# Patient Record
Sex: MALE | Race: WHITE
[De-identification: names, ages, dates, MRNs, and addresses within clinical notes are randomized per-mention and may not be internally consistent; named-entity substitution may affect disease eponyms.]

---

## 2018-02-08 ENCOUNTER — HOSPITAL ENCOUNTER (OUTPATIENT)
Dept: HOSPITAL 17 - NEPC | Age: 43
Setting detail: OBSERVATION
Discharge: HOME | End: 2018-02-08
Attending: INTERNAL MEDICINE | Admitting: INTERNAL MEDICINE
Payer: COMMERCIAL

## 2018-02-08 VITALS
HEART RATE: 71 BPM | SYSTOLIC BLOOD PRESSURE: 164 MMHG | RESPIRATION RATE: 16 BRPM | DIASTOLIC BLOOD PRESSURE: 94 MMHG | TEMPERATURE: 97.7 F | OXYGEN SATURATION: 99 %

## 2018-02-08 VITALS
HEART RATE: 95 BPM | SYSTOLIC BLOOD PRESSURE: 207 MMHG | RESPIRATION RATE: 14 BRPM | OXYGEN SATURATION: 98 % | DIASTOLIC BLOOD PRESSURE: 105 MMHG | TEMPERATURE: 98.2 F

## 2018-02-08 VITALS — HEIGHT: 66 IN | WEIGHT: 205.43 LBS | BODY MASS INDEX: 33.01 KG/M2

## 2018-02-08 VITALS
DIASTOLIC BLOOD PRESSURE: 96 MMHG | RESPIRATION RATE: 18 BRPM | SYSTOLIC BLOOD PRESSURE: 214 MMHG | OXYGEN SATURATION: 99 % | HEART RATE: 82 BPM

## 2018-02-08 VITALS
OXYGEN SATURATION: 96 % | HEART RATE: 77 BPM | DIASTOLIC BLOOD PRESSURE: 96 MMHG | SYSTOLIC BLOOD PRESSURE: 184 MMHG | RESPIRATION RATE: 19 BRPM

## 2018-02-08 VITALS
SYSTOLIC BLOOD PRESSURE: 151 MMHG | RESPIRATION RATE: 16 BRPM | HEART RATE: 74 BPM | DIASTOLIC BLOOD PRESSURE: 82 MMHG | OXYGEN SATURATION: 98 %

## 2018-02-08 VITALS — DIASTOLIC BLOOD PRESSURE: 92 MMHG | SYSTOLIC BLOOD PRESSURE: 161 MMHG

## 2018-02-08 DIAGNOSIS — E78.5: ICD-10-CM

## 2018-02-08 DIAGNOSIS — Z79.899: ICD-10-CM

## 2018-02-08 DIAGNOSIS — F17.200: ICD-10-CM

## 2018-02-08 DIAGNOSIS — R94.31: ICD-10-CM

## 2018-02-08 DIAGNOSIS — R07.89: ICD-10-CM

## 2018-02-08 DIAGNOSIS — I10: Primary | ICD-10-CM

## 2018-02-08 LAB
BASOPHILS # BLD AUTO: 0 TH/MM3 (ref 0–0.2)
BASOPHILS NFR BLD: 0.5 % (ref 0–2)
BUN SERPL-MCNC: 14 MG/DL (ref 7–18)
CALCIUM SERPL-MCNC: 8.3 MG/DL (ref 8.5–10.1)
CHLORIDE SERPL-SCNC: 106 MEQ/L (ref 98–107)
CREAT SERPL-MCNC: 0.96 MG/DL (ref 0.6–1.3)
EOSINOPHIL # BLD: 0.3 TH/MM3 (ref 0–0.4)
EOSINOPHIL NFR BLD: 3.6 % (ref 0–4)
ERYTHROCYTE [DISTWIDTH] IN BLOOD BY AUTOMATED COUNT: 13.5 % (ref 11.6–17.2)
GFR SERPLBLD BASED ON 1.73 SQ M-ARVRAT: 86 ML/MIN (ref 89–?)
GLUCOSE SERPL-MCNC: 99 MG/DL (ref 74–106)
HCO3 BLD-SCNC: 27.2 MEQ/L (ref 21–32)
HCT VFR BLD CALC: 46.8 % (ref 39–51)
HGB BLD-MCNC: 16.2 GM/DL (ref 13–17)
INR PPP: 1.1 RATIO
LYMPHOCYTES # BLD AUTO: 1.5 TH/MM3 (ref 1–4.8)
LYMPHOCYTES NFR BLD AUTO: 17.1 % (ref 9–44)
MAGNESIUM SERPL-MCNC: 1.9 MG/DL (ref 1.5–2.5)
MCH RBC QN AUTO: 32 PG (ref 27–34)
MCHC RBC AUTO-ENTMCNC: 34.6 % (ref 32–36)
MCV RBC AUTO: 92.4 FL (ref 80–100)
MONOCYTE #: 0.8 TH/MM3 (ref 0–0.9)
MONOCYTES NFR BLD: 8.7 % (ref 0–8)
NEUTROPHILS # BLD AUTO: 6.2 TH/MM3 (ref 1.8–7.7)
NEUTROPHILS NFR BLD AUTO: 70.1 % (ref 16–70)
PLATELET # BLD: 206 TH/MM3 (ref 150–450)
PMV BLD AUTO: 9 FL (ref 7–11)
PROTHROMBIN TIME: 10.8 SEC (ref 9.8–11.6)
RBC # BLD AUTO: 5.06 MIL/MM3 (ref 4.5–5.9)
SODIUM SERPL-SCNC: 139 MEQ/L (ref 136–145)
TROPONIN I SERPL-MCNC: (no result) NG/ML (ref 0.02–0.05)
TROPONIN I SERPL-MCNC: (no result) NG/ML (ref 0.02–0.05)
WBC # BLD AUTO: 8.8 TH/MM3 (ref 4–11)

## 2018-02-08 PROCEDURE — 82552 ASSAY OF CPK IN BLOOD: CPT

## 2018-02-08 PROCEDURE — 85730 THROMBOPLASTIN TIME PARTIAL: CPT

## 2018-02-08 PROCEDURE — G0378 HOSPITAL OBSERVATION PER HR: HCPCS

## 2018-02-08 PROCEDURE — 93017 CV STRESS TEST TRACING ONLY: CPT

## 2018-02-08 PROCEDURE — 93005 ELECTROCARDIOGRAM TRACING: CPT

## 2018-02-08 PROCEDURE — 82550 ASSAY OF CK (CPK): CPT

## 2018-02-08 PROCEDURE — 71046 X-RAY EXAM CHEST 2 VIEWS: CPT

## 2018-02-08 PROCEDURE — 83735 ASSAY OF MAGNESIUM: CPT

## 2018-02-08 PROCEDURE — 85610 PROTHROMBIN TIME: CPT

## 2018-02-08 PROCEDURE — 84484 ASSAY OF TROPONIN QUANT: CPT

## 2018-02-08 PROCEDURE — 85025 COMPLETE CBC W/AUTO DIFF WBC: CPT

## 2018-02-08 PROCEDURE — 96374 THER/PROPH/DIAG INJ IV PUSH: CPT

## 2018-02-08 PROCEDURE — 99285 EMERGENCY DEPT VISIT HI MDM: CPT

## 2018-02-08 PROCEDURE — 80048 BASIC METABOLIC PNL TOTAL CA: CPT

## 2018-02-08 NOTE — PD
HPI


Chief Complaint:  Chest Pain


Time Seen by Provider:  13:51


Travel History


International Travel<30 days:  No


Contact w/Intl Traveler<30days:  No


Traveled to known affect area:  No





History of Present Illness


HPI


Patient comes to the emergency department complaining of chest pain began 

earlier today.  Please states he felt short of breath over the past couple days 

then today he felt a tightness throughout his chest radiating into his back and 

tingling in his left upper extremity.  Patient reports he is supposed be on 

blood pressure medicine but has been off of it for 2 months.  Patient reports 

he last had a stress test 5 years ago and last saw a cardiologist 5 years ago 

as well.  Denies any headache, diaphoresis, nausea, vomiting, fevers, or 

abdominal pain.  Denies anything making symptoms better or worse, but does feel 

better currently.  Patient states he took 2 full-strength aspirin prior to 

coming to the emergency department.





PFSH


Past Medical History


Cardiovascular Problems:  Yes (HTN, cholesterol)


High Cholesterol:  Yes


Hypertension:  Yes


Tetanus Vaccination:  < 5 Years


Influenza Vaccination:  No





Past Surgical History


Surgical History:  No Previous Surgery





Social History


Alcohol Use:  Yes (DAILY)


Tobacco Use:  Yes


Substance Use:  No





Allergies-Medications


(Allergen,Severity, Reaction):  


Coded Allergies:  


     No Known Allergies (Unverified , 2/8/18)


Reported Meds & Prescriptions





Reported Meds & Active Scripts


Active


No Active Prescriptions or Reported Medications    








Review of Systems


Except as stated in HPI:  all other systems reviewed are Neg





Physical Exam


Narrative


GENERAL: Well-developed, well nourished, in no acute distress, and non-ill 

appearing.


SKIN: Focused skin assessment warm and dry.


HEAD: Atraumatic. Normocephalic. 


EYES: Pupils equal and round. EOMI. No scleral icterus. No injection or 

drainage. 


ENT: No nasal bleeding or discharge.  Mucous membranes pink and moist.


NECK: Trachea midline. No JVD. Supple.  No nuclear rigidity.


CARDIOVASCULAR: Regular rate and rhythm.  No murmur appreciated.


RESPIRATORY: No accessory muscle use.  No respiratory distress. Clear to 

auscultation. Breath sounds equal bilaterally. 


MUSCULOSKELETAL: No obvious deformities. No clubbing.  No cyanosis.  No edema.  

Full range of motion.


NEUROLOGICAL: Awake and alert. No obvious cranial nerve deficits.  Motor 

grossly within normal limits. Normal speech.


PSYCHIATRIC: Appropriate mood and affect; insight and judgment normal.





Data


Data


Last Documented VS





Vital Signs








  Date Time  Temp Pulse Resp B/P (MAP) Pulse Ox O2 Delivery O2 Flow Rate FiO2


 


2/8/18 14:10     96 Room Air  


 


2/8/18 14:10  77 19 184/103 (130)    





    214/96 (135)    


 


2/8/18 12:20 98.2       








Orders





 Orders


Electrocardiogram (2/8/18 12:40)


Basic Metabolic Panel (Bmp) (2/8/18 12:40)


Ckmb (Isoenzyme) Profile (2/8/18 12:40)


Complete Blood Count With Diff (2/8/18 12:40)


Magnesium (Mg) (2/8/18 12:40)


Prothrombin Time / Inr (Pt) (2/8/18 12:40)


Act Partial Throm Time (Ptt) (2/8/18 12:40)


Troponin I (2/8/18 12:40)


Chest, Pa & Lat (2/8/18 12:40)


CKMB (2/8/18 12:55)


CKMB% (2/8/18 12:55)


Ecg Monitoring (2/8/18 14:00)


Bilateral Bp Monitoring (2/8/18 14:00)


Iv Access Insert/Monitor (2/8/18 14:00)


Oximetry (2/8/18 14:00)


Oxygen Administration (2/8/18 14:00)


Nitroglycerin 2% Oint (Nitroglycerin 2% (2/8/18 14:00)


Sodium Chloride 0.9% Flush (Ns Flush) (2/8/18 14:00)


Hydralazine Inj (Apresoline Inj) (2/8/18 14:00)


Admit Order (Ed Use Only) (2/8/18 14:45)





Labs





Laboratory Tests








Test


  2/8/18


12:55


 


White Blood Count 8.8 TH/MM3 


 


Red Blood Count 5.06 MIL/MM3 


 


Hemoglobin 16.2 GM/DL 


 


Hematocrit 46.8 % 


 


Mean Corpuscular Volume 92.4 FL 


 


Mean Corpuscular Hemoglobin 32.0 PG 


 


Mean Corpuscular Hemoglobin


Concent 34.6 % 


 


 


Red Cell Distribution Width 13.5 % 


 


Platelet Count 206 TH/MM3 


 


Mean Platelet Volume 9.0 FL 


 


Neutrophils (%) (Auto) 70.1 % 


 


Lymphocytes (%) (Auto) 17.1 % 


 


Monocytes (%) (Auto) 8.7 % 


 


Eosinophils (%) (Auto) 3.6 % 


 


Basophils (%) (Auto) 0.5 % 


 


Neutrophils # (Auto) 6.2 TH/MM3 


 


Lymphocytes # (Auto) 1.5 TH/MM3 


 


Monocytes # (Auto) 0.8 TH/MM3 


 


Eosinophils # (Auto) 0.3 TH/MM3 


 


Basophils # (Auto) 0.0 TH/MM3 


 


CBC Comment DIFF FINAL 


 


Differential Comment  


 


Prothrombin Time 10.8 SEC 


 


Prothromb Time International


Ratio 1.1 RATIO 


 


 


Activated Partial


Thromboplast Time 28.5 SEC 


 


 


Blood Urea Nitrogen 14 MG/DL 


 


Creatinine 0.96 MG/DL 


 


Random Glucose 99 MG/DL 


 


Calcium Level 8.3 MG/DL 


 


Magnesium Level 1.9 MG/DL 


 


Sodium Level 139 MEQ/L 


 


Potassium Level 4.0 MEQ/L 


 


Chloride Level 106 MEQ/L 


 


Carbon Dioxide Level 27.2 MEQ/L 


 


Anion Gap 6 MEQ/L 


 


Estimat Glomerular Filtration


Rate 86 ML/MIN 


 


 


Total Creatine Kinase 118 U/L 


 


Creatine Kinase MB 0.7 NG/ML 


 


Troponin I


  LESS THAN 0.02


NG/ML











MDM


Medical Decision Making


Medical Screen Exam Complete:  Yes


Emergency Medical Condition:  Yes


Interpretation(s)


EKG reviewed by Dr. Gusman shows sinus rhythm ventricular rate of 68.  No 

STEMI.








Last Impressions








Chest X-Ray 2/8/18 1240 Signed





Impressions: 





 Service Date/Time:  Thursday, February 8, 2018 13:20 - CONCLUSION:  No acute 





 cardiopulmonary process. Lungs are clear.     Jimenez Cash MD 








Differential Diagnosis


Acute coronary syndrome, atypical chest pain, uncontrolled hypertension, 

hypertension urgency, medical noncompliance, metabolic disturbance, pneumonia, 

pneumothorax


Narrative Course


Patient was seen and examined.  IV was established and patient was placed on 

cardiac monitoring.  Patient was given Nitropaste and IV hydralazine, which 

improved patient's blood pressure.  Labs had been ordered in triage were 

reviewed along with chest x-ray and EKG.  Discussed all findings and plan of 

care with patient, who is agreeable for admission.  All questions were 

answered.  Discussed patient with Dr. Gusman, who is in agreement with plan of 

care and disposition.  Patient remained stable throughout ED course.





Diagnosis





 Primary Impression:  


 Chest pain


 Qualified Codes:  R07.9 - Chest pain, unspecified





Admitting Information


Admitting Physician Requests:  Observation


Scripts


No Active Prescriptions or Reported Meds


Condition:  Stable











Kevin Garcia Feb 8, 2018 14:06

## 2018-02-08 NOTE — HHI.HP
HPI


Primary Care Physician


No Primary Care Physician


Chief Complaint


Chest tightness


History of Present Illness


42-year-old male with history of hypertension and hyperlipidemia presents to 

emergency room for further evaluation of chest tightness.  Endorses been out of 

his cholesterol and blood pressure medication for a couple months.  Onset 9 AM.

  Location substernal.  Characterized as tightness.  Severity moderate.  

Radiation left shoulder blade describes as "soreness" lasting few minutes with 

accompanying left fingertips numbness/tingling. No associated symptoms of nausea

, vomiting, dyspnea, or diaphoreses.  No known precipitating or relieving 

factors.  Endorses last 2-3 weeks difficult to take a deep breath with 

intermittent nonproductive cough. No fever or chills. Current chest pain free. 

Endorses similar chest discomfort in the past, having cardiac work up three 

times, all reported to be normal.





Review of Systems


General: No fatigue,weakness, fever, chills, recent illness, or change in 

appetite


HEENT: No HA, no vision changes, no nasal congestion, no dysphasia, 

intermittent postnasal drip 2 weeks


CV: As stated above.  No current chest tightness.  No palpitations, 

intermittent leg pain, or dizziness


RESP: No SOB, cough, wheeze, or upper respiratory infection


GI: No nausea, vomiting, bowel changes, diarrhea, constipation, pain, distention

, melena, or blood in the stool.  No change in appetite, no unintentional 

weight gain or weight loss.


: No dysuria, urgency, frequency


EXT: No lower leg edema, no paraesthesias


MS: No discomfort,injury, recent trauma, or change in ROM


NEURO: No difficulty with balance, LOC, or motor/sensory deficits


PSYCH: No anxiety, depression, suicidal ideation. Past panic attacks, however 

does not feel to be under much stress lately. 


SKIN: No rashes, no concerning lesions





Past Family Social History


Allergies:  


Coded Allergies:  


     No Known Allergies (Unverified , 18)


Past Medical History


Hypertension, hyperlipidemia


Past Surgical History


None


Reported Medications





Reported Meds & Active Scripts


Active


No Active Prescriptions or Reported Medications    


Was taking lipitor 80mg daily and lisinopril 10mg daily, has ran out of 

medications x2 months


Active Ordered Medications





Current Medications








 Medications


  (Trade)  Dose


 Ordered  Sig/Ruth


 Route  Start Time


 Stop Time Status Last Admin


 


  (NS Flush)  2 ml  UNSCH  PRN


 IVF  18 14:00


     


 


 


  (NS Flush)  2 ml  BID


 IV FLUSH  18 21:00


     


 


 


  (Tylenol)  500 mg  Q4H  PRN


 PO  18 15:15


     


 


 


  (Zofran Inj)  4 mg  Q6H  PRN


 IV PUSH  18 15:15


     


 


 


  (Nitrostat Sl)  0.4 mg  Q5M  PRN


 SL  18 15:15


     


 


 


  (Aspirin)  325 mg  DAILY


 PO  18 09:00


   UNV  


 








Family History


Noncontributory for early onset cardiovascular disease.


Social History


Known hypertension and hyperlipidemia (out of medications x2 months). No known 

diabetes or CAD.


Current 1 pack/day smoker x 20 years. Endorses 6-7 beers nightly. Denies any 

illegal drug use.


Endorses sedentary lifestyle.





Past cardiac testing


No recent stress testing, last exercise testing approx. 5-6 years.





Physical Exam


Vital Signs





Vital Signs








  Date Time  Temp Pulse Resp B/P (MAP) Pulse Ox O2 Delivery O2 Flow Rate FiO2


 


18 15:50  76 16 161/92 (115) 99   


 


18 14:48  74 16 151/82 (105) 98 Room Air  


 


18 14:10     96 Room Air  


 


18 14:10  77 19 184/103 (130) 96 Room Air  





    214/96 (135)    


 


18 14:10  73 19 184/103 (130) 98 Room Air  


 


18 13:59  82 18 214/96 (135) 99 Room Air  


 


18 13:53  75 18  98 Room Air  


 


18 12:20 98.2 95 14 207/105 (139) 98   








Physical Exam


GENERAL: Alert WN, WD, NAD, pleasant,  male


HEAD: NC, AT


CV: RRR, without murmur, rub, gallop, no JVD, S1-S2 no S3-S4.  Chest wall 

nontender with palpation.


RESP: Clear lungs throughout bilateral, no crackles, wheeze, rhonchi, 

symmetrical chest rise, nonlabored, able to speak in full sentences


ABD: Soft, NT, ND, no masses, positive bowel tones


EXT: Pulses +24, no dependent edema


MS: Normal tone 4 extremities,  no obvious deformities, full range of motion


NEURO: CN II through CN XII grossly intact, motor strength 5/5, gait WNL


PSYCH: A+O 3, pleasant affect, appropriate speech, mood, insight and judgment


SKIN: Normal turgor, normal texture, no lesions, no rashes, brisk cap refill, 

even hair distribution


Laboratory





Laboratory Tests








Test


  18


12:55


 


White Blood Count 8.8 


 


Red Blood Count 5.06 


 


Hemoglobin 16.2 


 


Hematocrit 46.8 


 


Mean Corpuscular Volume 92.4 


 


Mean Corpuscular Hemoglobin 32.0 


 


Mean Corpuscular Hemoglobin


Concent 34.6 


 


 


Red Cell Distribution Width 13.5 


 


Platelet Count 206 


 


Mean Platelet Volume 9.0 


 


Neutrophils (%) (Auto) 70.1 


 


Lymphocytes (%) (Auto) 17.1 


 


Monocytes (%) (Auto) 8.7 


 


Eosinophils (%) (Auto) 3.6 


 


Basophils (%) (Auto) 0.5 


 


Neutrophils # (Auto) 6.2 


 


Lymphocytes # (Auto) 1.5 


 


Monocytes # (Auto) 0.8 


 


Eosinophils # (Auto) 0.3 


 


Basophils # (Auto) 0.0 


 


CBC Comment DIFF FINAL 


 


Differential Comment  


 


Prothrombin Time 10.8 


 


Prothromb Time International


Ratio 1.1 


 


 


Activated Partial


Thromboplast Time 28.5 


 


 


Blood Urea Nitrogen 14 


 


Creatinine 0.96 


 


Random Glucose 99 


 


Calcium Level 8.3 


 


Magnesium Level 1.9 


 


Sodium Level 139 


 


Potassium Level 4.0 


 


Chloride Level 106 


 


Carbon Dioxide Level 27.2 


 


Anion Gap 6 


 


Estimat Glomerular Filtration


Rate 86 


 


 


Total Creatine Kinase 118 


 


Creatine Kinase MB 0.7 


 


Troponin I LESS THAN 0.02 








Result Diagram:  


18 1255                                                                    

            18 1255





Imaging





Last 48 hours Impressions








Chest X-Ray 18 1240 Signed





Impressions: 





 Service Date/Time:   13:20 - CONCLUSION:  No acute 





 cardiopulmonary process. Lungs are clear.     Jimenez Cash MD 








Course


EKG


Normal sinus rhythm, no ST or T-segment changes





Caprini VTE Risk Assessment


Caprini VTE Risk Assessment:  No/Low Risk (score <= 1)


Caprini Risk Assessment Model











 Point Value = 1          Point Value = 2  Point Value = 3  Point Value = 5


 


Age 41-60


Minor surgery


BMI > 25 kg/m2


Swollen legs


Varicose veins


Pregnancy or postpartum


History of unexplained or recurrent


   spontaneous 


Oral contraceptives or hormone


   replacement


Sepsis (< 1 month)


Serious lung disease, including


   pneumonia (< 1 month)


Abnormal pulmonary function


Acute myocardial infarction


Congestive heart failure (< 1 month)


History of inflammatory bowel disease


Medical patient at bed rest Age 61-74


Arthroscopic surgery


Major open surgery (> 45 min)


Laparoscopic surgery (> 45 min)


Malignancy


Confined to bed (> 72 hours)


Immobilizing plaster cast


Central venous access Age >= 75


History of VTE


Family history of VTE


Factor V Leiden


Prothrombin 47636R


Lupus anticoagulant


Anticardiolipin antibodies


Elevated serum homocysteine


Heparin-induced thrombocytopenia


Other congenital or acquired


   thrombophilia Stroke (< 1 month)


Elective arthroplasty


Hip, pelvis, or leg fracture


Acute spinal cord injury (< 1 month)








Prophylaxis Regimen











   Total Risk


Factor Score Risk Level Prophylaxis Regimen


 


0-1      Low Early ambulation


 


2 Moderate Order ONE of the following:


*Sequential Compression Device (SCD)


*Heparin 5000 units SQ BID


 


3-4 Higher Order ONE of the following medications:


*Heparin 5000 units SQ TID


*Enoxaparin/Lovenox 40 mg SQ daily (WT < 150 kg, CrCl > 30 mL/min)


*Enoxaparin/Lovenox 30 mg SQ daily (WT < 150 kg, CrCl > 10-29 mL/min)


*Enoxaparin/Lovenox 30 mg SQ BID (WT < 150 kg, CrCl > 30 mL/min)


AND/OR


*Sequential Compression Device (SCD)


 


5 or more Highest Order ONE of the following medications:


*Heparin 5000 units SQ TID (Preferred with Epidurals)


*Enoxaparin/Lovenox 40 mg SQ daily (WT < 150 kg, CrCl > 30 mL/min)


*Enoxaparin/Lovenox 30 mg SQ daily (WT < 150 kg, CrCl > 10-29 mL/min)


*Enoxaparin/Lovenox 30 mg SQ BID (WT < 150 kg, CrCl > 30 mL/min)


AND


*Sequential Compression Device (SCD)











Assessment and Plan


Code Status


#1 Atypical chest pain-admitted chest pain center.  Rule out with 2 sets of EKG 

and cardiac enzymes. Seen and evaluated by Dr. Pascual Charles.  Proceed with 

exercise stress test this evening.  If unremarkable discharge later this 

evening.  Patient agreeable to plan of care.  Strongly encouraged stress 

importance of establishing with a primary care provider.  Verbalized 

understanding.


#2 Hypertension-amlodipine 5 mg 1 dose now, clonidine 0.1 mg every 6 hours 

when necessary for systolic greater than 180 or diastolic greater than 100.  

Upon discharge will provide prescription for lisinopril 10 mg with 1 refill.  

Strongly encouraged and stressed the importance of tight blood pressure control 

and not running out of blood pressure medication.  Encouraged increasing daily 

activity, weight loss, and sodium restriction of no more than 1-2 grams daily.


#3 History of hyperlipidemia-will rewrite atorvastatin 80 mg daily at discharge.


#4 Tobacco use-strongly encouraged and stressed the importance of tobacco 

cessation.  Instructed to quit smoking.


Discussed daily recommended intake of alcohol of no more than 2 drinks daily.  

Discussed importance of decreasing his alcohol intake.











Kemi Bird 2018 16:03

## 2018-02-08 NOTE — RADRPT
EXAM DATE/TIME:  02/08/2018 13:20 

 

[facility] COMPARISON:     

No previous studies available for comparison.

 

                     

INDICATIONS :     

Chest tightness, cough.

                     

 

MEDICAL HISTORY :     

None.          

 

SURGICAL HISTORY :     

None.   

 

ENCOUNTER:     

Initial                                        

 

ACUITY:     

2 weeks      

 

PAIN SCORE:     

2/10

 

LOCATION:     

Bilateral chest 

 

FINDINGS:     

PA and lateral views of the chest demonstrate the lungs to be symmetrically aerated without evidence 
of mass, infiltrate or effusion.  The cardiomediastinal contours are unremarkable.  Osseous structure
s are intact.

 

CONCLUSION:     

No acute cardiopulmonary process. Lungs are clear.

 

 

 

 Jimenez Cash MD on February 08, 2018 at 13:30           

Board Certified Radiologist.

 This report was verified electronically.

## 2018-02-08 NOTE — HHI.DCPOC
Discharge Care Plan


Diagnosis:  


(1) Hypertension


(2) Atypical chest pain


(3) History of hyperlipidemia


Goals to Promote Your Health


* To prevent worsening of your condition and complications


* To maintain your health at the optimal level


Directions to Meet Your Goals


*** Take your medications as prescribed


*** Follow your dietary instruction


*** Follow activity as directed








*** Keep your appointments as scheduled


*** Take your immunizations and boosters as scheduled


*** If your symptoms worsen call your PCP, if no PCP go to Urgent Care Center 

or Emergency Room***


*** Smoking is Dangerous to Your Health. Avoid second hand smoke***


***Call the 24-hour hour crisis hotline for domestic abuse at 1-474.715.7637***











Kemi Bird Feb 8, 2018 17:59

## 2018-02-09 NOTE — TR
Date Performed: 02/08/2018       Time Performed: 17:21:00

 

DOCTOR:      Pascual Charles 

 

DRUG LIST:     

CLINICAL HISTORY:     

REASON FOR TEST:      Chest pain

REASON FOR ENDING:     

OBSERVATION:     

CONCLUSION:      [place] protocol completed. Stopped sec to exceeding target heart rate and leg fatigue
. Maximum BY=123 Target HR Achieved=85.0% Maximum HG=789/96 Total Exercise Time=8:06. No reprod chest
 pain. No ectopy. No st t segment changes. Hypertensive bp response. Good exercise tolerance. Recover
y quick and unremarkable.

COMMENTS:      Patient exercised using the Ollie protocol. No electrocardiographic changes were seen 
to suggest ischemia. Hemodynamic response to exercise was normal. No significant arrhythmia was prese
nt.

## 2018-02-09 NOTE — EKG
Date Performed: 02/08/2018       Time Performed: 16:26:03

 

PTAGE:      42 years

 

EKG:      Sinus rhythm 

 

 POSSIBLE LEFT ATRIAL ENLARGEMENT BORDERLINE ECG

 

PREVIOUS TRACING       : 02/08/2018 13.04 Since previous tracing, no significant change noted

 

DOCTOR:   Pascual Charles  Interpretating Date/Time  02/09/2018 16:18:42

## 2018-02-09 NOTE — EKG
Date Performed: 02/08/2018       Time Performed: 13:04:04

 

PTAGE:      42 years

 

EKG:      Sinus rhythm 

 

 NORMAL ECG 

 

NO PREVIOUS TRACING            

 

DOCTOR:   Pascual Charles  Interpretating Date/Time  02/09/2018 16:19:36